# Patient Record
(demographics unavailable — no encounter records)

---

## 2024-10-29 NOTE — REASON FOR VISIT
[FreeTextEntry2] : 10/29/2024 Only gets occasional soreness at elbow, overall doing ok, was concerned about a skin crease medial arm 04/04/2024 Has pain/swelling down to antecubital fossa right elbow. Had MRI: rotator cuff tendinopathy, SLAP tear labrum 03/11/2024 Started PT, not making any progress

## 2024-10-29 NOTE — HISTORY OF PRESENT ILLNESS
[Right Arm] : right arm [Dull/Aching] : dull/aching [Radiating] : radiating [Sharp] : sharp [de-identified] : Reached for something overhead, felt a burning pain from axillary down right upper/inner arm. Still has soreness there. No discoloration. Works as .  [] : no [FreeTextEntry3] : 2/10/24 [FreeTextEntry5] : patient felt pop and pain in arm while reaching for something  [FreeTextEntry7] : whole arm

## 2024-10-29 NOTE — PHYSICAL EXAM
[FreeTextEntry8] : tender axilla to medial upper 1/2 arm.  [Right] : right elbow [] : no tenderness over biceps tendon [5___] : supination 5[unfilled]/5

## 2025-03-26 NOTE — ASSESSMENT
[FreeTextEntry1] : Urine studies are ordered. For hypogonadism, testosterone levels, hemoglobin and PSA will be obtained. Patient will  be placed on tamsulosin and return to the office for a transrectal prostate ultrasound. A CAT scan will be obtained to assess for inguinal hernia.

## 2025-03-26 NOTE — HISTORY OF PRESENT ILLNESS
[FreeTextEntry1] : This 50 year old male presents with complaint of left inguinal pain. He also notes some trouble voiding at times. Reports he has constipation for which he is being treated by his gastroenterologist. He notes straining makes his symptoms worse and denies any other urinary complaints. Patient is also on testosterone replacement therapy which he administers himself.

## 2025-03-26 NOTE — PHYSICAL EXAM
[Normal Appearance] : normal appearance [Well Groomed] : well groomed [General Appearance - In No Acute Distress] : no acute distress [Edema] : no peripheral edema [Respiration, Rhythm And Depth] : normal respiratory rhythm and effort [Exaggerated Use Of Accessory Muscles For Inspiration] : no accessory muscle use [Abdomen Soft] : soft [Abdomen Tenderness] : non-tender [Costovertebral Angle Tenderness] : no ~M costovertebral angle tenderness [Urethral Meatus] : meatus normal [Urinary Bladder Findings] : the bladder was normal on palpation [Testes Tenderness] : no tenderness of the testes [Scrotum] : the scrotum was normal [Testes Mass (___cm)] : there were no testicular masses [Prostate Tenderness] : the prostate was not tender [No Prostate Nodules] : no prostate nodules [Normal Station and Gait] : the gait and station were normal for the patient's age [] : no rash [No Focal Deficits] : no focal deficits [Oriented To Time, Place, And Person] : oriented to person, place, and time [Affect] : the affect was normal [Mood] : the mood was normal [No Palpable Adenopathy] : no palpable adenopathy [Chaperone Present] : A chaperone was present in the examining room during all aspects of the physical examination [de-identified] : Left inguinal tenderness [FreeTextEntry2] : TONEY Kenny

## 2025-03-26 NOTE — PHYSICAL EXAM
[Normal Appearance] : normal appearance [Well Groomed] : well groomed [General Appearance - In No Acute Distress] : no acute distress [Edema] : no peripheral edema [Respiration, Rhythm And Depth] : normal respiratory rhythm and effort [Exaggerated Use Of Accessory Muscles For Inspiration] : no accessory muscle use [Abdomen Soft] : soft [Abdomen Tenderness] : non-tender [Costovertebral Angle Tenderness] : no ~M costovertebral angle tenderness [Urethral Meatus] : meatus normal [Urinary Bladder Findings] : the bladder was normal on palpation [Scrotum] : the scrotum was normal [Testes Tenderness] : no tenderness of the testes [Testes Mass (___cm)] : there were no testicular masses [Prostate Tenderness] : the prostate was not tender [No Prostate Nodules] : no prostate nodules [Normal Station and Gait] : the gait and station were normal for the patient's age [] : no rash [No Focal Deficits] : no focal deficits [Oriented To Time, Place, And Person] : oriented to person, place, and time [Affect] : the affect was normal [Mood] : the mood was normal [No Palpable Adenopathy] : no palpable adenopathy [Chaperone Present] : A chaperone was present in the examining room during all aspects of the physical examination [de-identified] : Left inguinal tenderness [FreeTextEntry2] : TONEY Kenny

## 2025-03-26 NOTE — REVIEW OF SYSTEMS
[Negative] : Heme/Lymph [Abdominal Pain] : abdominal pain [Constipation] : constipation [Wake up at night to urinate  How many times?  ___] : wakes up to urinate [unfilled] times during the night [FreeTextEntry2] : frequent urination- 5